# Patient Record
Sex: FEMALE
[De-identification: names, ages, dates, MRNs, and addresses within clinical notes are randomized per-mention and may not be internally consistent; named-entity substitution may affect disease eponyms.]

---

## 2019-04-22 NOTE — ULT
Ultrasound pelvic transabdominal with Doppler



HISTORY: Pelvic pain



COMPARISON: None



TECHNIQUE: Multiple grayscale and color Doppler images were obtained in a transabdominal pelvic ultra
sound. Spectral analysis of the Doppler waveforms of the ovaries were performed.





FINDINGS:

CERVIX: Unremarkable

UTERUS: Normal in size without focal abnormality.

ENDOMETRIAL STRIPE: 12 mm. Thickened.



No free fluid is present.



RIGHT OVARY: Normal flow, without focal mass. Benign-appearing cyst is present.

LEFT OVARY: Normal flow, without focal mass.





IMPRESSION: Mild thickening of the endometrium. This may be physiologic. Six-week pelvic ultrasound i
s recommended for further evaluation.



Reported By: Jono Diaz 

Electronically Signed:  4/22/2019 4:47 PM

## 2019-04-26 NOTE — CT
Exam: CT thoracic spine without contrast



HISTORY: Upper abdominal pain. Previous spinal injections.



COMPARISON: None



FINDINGS: Thoracic spine vertebral body height is maintained. No fracture. No spondylolisthesis or sp
ondylolysis.

Limited evaluation the contents of the central spinal canal and neural foramina. No significant steno
sis or neural foraminal narrowing

Visualized mediastinal structures are unremarkable. Visualized lung parenchyma is also unremarkable



IMPRESSION: Unremarkable thoracic spine CT. No significant stenosis on this examination, based upon n
oncontrast CT limitations



Reported By: Tana Elliott 

Electronically Signed:  4/26/2019 2:34 PM

## 2019-04-26 NOTE — CT
CT abdomen and pelvis with IV and oral contrast



HISTORY: Right abdomen pain.



FINDINGS: Lung bases are clear. Gallbladder is surgically absent. Solid organs are unremarkable. Urin
priya bladder has a normal appearance. Physiologic amount of free fluid within the pelvis. Appendix

is not inflamed.



In the left upper quadrant, the most proximal portion of the jejunum shows mild luminal expansion. Wa
ll is dilated to 0.7 cm thickness and is hyperdense on this portal venous phase imaging.. This

extends over a length of approximately 10 cm.



There is also very subtle edematous appearance of the gastric antrum with minimal adjacent fluid.







IMPRESSION: Wall thickening, hyperemia, and mild luminal distention of the most proximal portion of t
he jejunum. Very mild inflammation of the gastric antrum with small amount of adjacent fluid.



Inflammation (inflammatory bowel disease) versus neoplasm (small bowel lymphoma) are primary consider
ations.



Please consider endoscopic evaluation. Hopefully the most proximal portion of the jejunum can be visu
alized.



Reported By: DINESH Crowley 

Electronically Signed:  4/26/2019 2:33 PM

## 2019-04-28 NOTE — HP
CHIEF COMPLAINT:  Abdominal pain.



HISTORY OF PRESENT ILLNESS:  Ms. Nogueira is a 38-year-old woman, who developed

right-sided abdominal pain 2 weeks ago.  She complains of an aching to cramping pain

that originally started in the right lower quadrant that she thought was an ovarian

cyst, but then the pain moved up and settled more in the right upper quadrant

underneath the right ribs.  The pain is worse when she moves around, but not

affected by eating.  The pain is not affected by bowel movement.  She has a normal

soft brown bowel movement daily.  She has had no blood in the stool or no black

stools.  Her weight has been stable.  The pain though has been continuous, lasting

most of the day and then will exacerbate at times when she moves around or when she

sits for longer periods.  She has had no fever with this.  The pain is affected her

ability to exercise and do the things she needs to do.  She had blood work and a CT

scan to evaluate her pain.  The CT scan showed thickening of the proximal most

jejunum.  GI was asked to evaluate this. 



PAST MEDICAL HISTORY:  Negative.



PAST SURGICAL HISTORY:  Cholecystectomy, tonsillectomy, and D and C.  she had EGD

and colonoscopy by Dr. Spencer murcia in June of 2016 for evaluation of microcytic

anemia.  The upper and lower endoscopy were normal.  Duodenal biopsies were negative

for celiac disease.  Her anemia since corrected. 



FAMILY HISTORY:  Positive for lung cancer in her mother.  Negative for GI malignancy.



SOCIAL HISTORY:  No alcohol, tobacco, or drugs.



ALLERGIES:  PREVIOUSLY, SHE HAD MORPHINE LISTED AS AN ALLERGY.



MEDICATIONS:  At home, none.



REVIEW OF SYSTEMS:  Negative x10 systems reviewed except as stated in the history of

present illness. 



PHYSICAL EXAMINATION:

GENERAL:  She is in no acute distress.  Alert and oriented x3. 

HEENT:  Eyes have no scleral icterus.  Oropharynx is clear without lesions. 

NECK:  No cervical or supraclavicular lymphadenopathy. 

LUNGS:  Clear to auscultation bilaterally. 

HEART:  Regular rate and rhythm without murmur. 

ABDOMEN:  Mild tenderness in the epigastric region and right upper quadrant without

guarding.  Bowel sounds are present. 

ABDOMEN:  Nontender in the lower left and mildly tender in the right lower quadrant.

 The bowel sounds are present. 

EXTREMITIES:  No lower extremity edema.  Cranial nerves are grossly intact.



LABORATORY DATA:  She had blood drawn on 04/26/2019.  Her white blood cell count was

9.5, hemoglobin 13.4, platelets 234, and eosinophil count 0.1.  Creatinine 0.77,

bilirubin 0.2, AST 13, ALT 9, alkaline phosphatase 59, albumin 4.2.  Pregnancy test

was negative.  She had CT scan of the abdomen and pelvis on 04/26/2019 that showed

in the left upper quadrant in the most proximal portion of the jejunum showing mild

luminal expansion with wall thickening to 0.7 cm and hyperdensity; however, a 10 cm

length of jejunum.  There was also some subtle edematous appearance to the gastric

antrum.  Concern for a neoplastic or inflammatory process was raised by the

radiologist regarding this.  She did have a pelvic ultrasound on 04/22/2019, which

showed a benign-appearing cyst in the right ovary and a normal left ovary.  She had

a thoracic spine CT for further evaluation of the pain, which was normal. 



IMPRESSION:  Epigastric to right upper quadrant abdominal aching pain.  This is a

continuous pain, but does worsen with movement of her torso and also sitting.  The

pain is not affected by eating, not affected by bowel movement.  She has had no

bleeding or diarrhea, constipation, nausea, or vomiting.  Her white blood cell count

and hemoglobin are normal.  This may be more of a musculoskeletal type pain.  The

findings of the CT are concerning, but we should be able to reach this area with

push enteroscopy with upper endoscopy with the colonoscope.  This could be artifact

or inflammatory process or neoplastic process.  She does not take any medications on

a regular basis. 



RECOMMENDATIONS:  Push enteroscopy today.







Job ID:  [id number]

## 2019-04-28 NOTE — RAD
Two-view abdomen: Supine and upright views.



INDICATIONS: Postop.



Contrast is seen throughout the colon. Bowel gas pattern unremarkable. Scattered small bowel gas with
out dilatation. No free air apparent.



IMPRESSION: No acute finding



Reported By: José Miguel Banerjee 

Electronically Signed:  4/28/2019 2:44 PM

## 2019-04-28 NOTE — OP
DATE OF PROCEDURE:  04/28/2019



PROCEDURES PERFORMED:  Esophagogastroduodenoscopy with biopsy and push enteroscopy.



PREOPERATIVE DIAGNOSES:  Right upper quadrant abdominal pain and abnormal CT scan

showing thickening of the jejunum in the proximal most jejunum just beyond the

duodenum. 



DESCRIPTION OF PROCEDURE:  Informed consent was obtained from the patient.  She was

sedated with total intravenous anesthesia.  The colonoscope was advanced well into

the proximal jejunum.  The esophagus was normal.  The GE junction was normal.  There

was very mild erosive gastritis in the antrum of the stomach.  Biopsies were

obtained to rule out H. pylori.  Retroflexed views in the stomach were normal.  The

pylorus and first, second, third, and fourth portions of the duodenum were normal.

The proximal jejunum was normal. 



IMPRESSION:  

1. Very mild erosive antral gastritis.  Biopsies obtained to rule out Helicobacter

pylori. 

2. Otherwise, normal esophagogastroduodenoscopy and push enteroscopy to the proximal

jejunum. 



RECOMMENDATIONS:  

1. Prilosec over-the-counter once daily for 2 weeks.

2. Await histopathology.

3. Follow up in GI Clinic with Dr. Palmer.  Consider capsule endoscopy to evaluate

further into the jejunum if her symptoms continue.  Overall, I do think that we

reached the area that was abnormal by CT and this was endoscopically normal mucosa.

Most likely, she had an area of nondistention of the bowel wall at that point. 







Job ID:  [id number]

## 2019-04-28 NOTE — HP
HISTORY OF PRESENT ILLNESS:  Anjelica Nogueira is a 38-year-old female with 2

weeks of abdominal pain.  This is described as pressure type pain in her right upper

quadrant, right flank, radiating to her pelvis.  It was initially thought to be

neurogenic.  She had a pelvic ultrasound because of her concern of ovarian cyst and

this did not reveal any significant findings.  She is felt that she might have SI

syndrome as some of her pain was in her right flank and lower back and as I

examined, it was equivocal, thus she saw Dr. Ricki Carlisle, who felt her pain was more

lower thoracic in distribution.  She underwent a nerve block last week without any

relief.  She continued with pain 2 days later, unrelenting, having difficulty

sleeping.  This is associated with anorexia and nausea.  She then underwent a CAT

scan of her thoracic spine and CAT scan of her abdomen and pelvis.  CAT scan of her

thoracic spine was unremarkable.  CAT scan of her abdomen and pelvis, IV and p.o.

contrast revealed post cholecystectomy state, wall thickening, hyperemia, mild

luminal distention of the most proximal portion of the jejunum, very mild

inflammation of the gastric antrum with small amount of adjacent fluid.  Concern

about inflammatory bowel disease or less likely neoplasia was raised.  She had a CBC

and comprehensive metabolic profile that were unremarkable.  The patient continued

to have severe pain despite meloxicam and oral analgesics, thus was brought in today

for her unrelenting abdominal pain and right upper quadrant pain and Dr. Dontae Manley saw her and upper endoscopy performed without significant finding.  She had

mild gastritis, biopsy performed to rule out H. pylori.  He used a colonoscope to

try to visualize the proximal jejunum and there was question whether there was able

to visualize the problem in the area, where the radiological abnormality on her CAT

scan.  Postoperatively, she had a tremendous amount of pain requiring narcotics.

With time, this improved and she was back with the pain that she came in for except

more severe.  She received narcotics, which did not really resolve the pain.  Repeat

abdominal x-rays flat and upright were obtained revealing lack of constipation,

residual contrast in her colon from her CAT scan a few days prior, but no evidence

of free air on upright x-ray. 



The patient is now admitted for pain control PCA.  Diet as tolerated today and plan

diagnostic laparoscopy tomorrow to review the proximal small bowel.  There is

question whether this was addressed endoscopically.  This is the only objective

abnormality found today.  Consideration repeating her abdominal pelvis CAT scan was

given, but thought not to have much utility based on the recently normal CT scan

done with p.o. and IV contrast and thoracic spine images which as noted above were

normal. 



ALLERGIES:  NONE.  TOBACCO, NONE.  ALCOHOL, RARELY.



MEDICATIONS:  None routinely.



PAST SURGICAL HISTORY:  Laparoscopic cholecystectomy.



PAST MEDICAL HISTORY:  Noncontributory.



PHYSICAL EXAMINATION:

LUNGS:  Clear to auscultation. 

CARDIAC:  Regular rate and rhythm without murmur or gallop. 

ABDOMEN:  Tenderness in her right upper quadrant.  She does not have any chest wall

tenderness or pain.  Thoracolumbar spine without tenderness.  SI joint compression

does not evoke SI joint type syndrome. 

EXTREMITIES:  Unremarkable.



ASSESSMENT AND PLAN:  Abdominal pain of uncertain etiology with an abnormal CAT scan

noting proximal small-bowel segment, hyperemia and thickening.  This is unrelenting

after 2 weeks and not explained by her upper endoscopy.  Two years ago, Dr. Tavera

saw her for iron-deficiency anemia with upper and lower endoscopy and she was

treated with vitamins and iron and this has resolved.  Plan diagnostic laparoscopy

in the morning and procedure is indicated based on endoscopic findings.  She

understands risks, benefits, and consents. 







Job ID:  [id number]

## 2019-04-29 NOTE — OP
DATE OF PROCEDURE:  04/29/2019



PREOPERATIVE DIAGNOSES:  Abdominal pain, abnormal CAT scan with proximal small-
bowel

wall thickening and hyperemia. 



POSTOPERATIVE DIAGNOSES:  Abdominal pain, abnormal CAT scan with proximal

small-bowel wall thickening and hyperemia without abnormal finding. 



PROCEDURES PERFORMED:  Diagnostic laparoscopy running the entire small bowel 
from

the ligament of Treitz to the cecum, terminal ileum, noting normal appendix, 
normal

ovaries, normal uterus and visceral structures. 



ANESTHESIA:  General, local 0.5% Marcaine with epinephrine, 30 mL.



DESCRIPTION OF PROCEDURE:  The patient was taken to the operating room under 
general

anesthesia, supine position, abdomen was prepared with ChloraPrep and draped in

routine fashion.  Local anesthetic was infiltrated in the skin and subcutaneous

tissue about each port site.  Left lateral stab incision made, subcostal and

pneumoperitoneum to 15 mmHg obtained with a Veress needle, replaced with a 5 
port

and laparoscope inserted.  Remainder of the port sites were placed under

laparoscopic visualization.  Left lateral mid abdominal incision made and a 5 
port

placed.  Left lower quadrant lateral incision made and a 5 port placed.  The 
cecum

was identified.  Appendix was long, but normal.  Terminal ileum was identified 
and

followed proximally to the ligament of Treitz, carefully inspecting the small 
bowel

and noting small bowel to be entirely normal.  I could see unusually more of the

duodenum and proximal jejunum than usual due to her lack of intraabdominal fat.
  The

proximal jejunum and fourth portion of the duodenum were normal.  There were no

abnormalities.  There were no masses, no induration, no serosal abnormalities.  
I

then visualized her ovaries, which appeared normal.  She did have a small 
amount of

fluid in her abdominal cavity, which was aspirated.  The right colon appeared 
to be

normal.  Transverse colon proximally seemed to be normal.  Status post

cholecystectomy and subhepatic space could be seen without any adhesions.  There

were no perihepatic adhesions.  The patient tolerated the procedure well.

Pneumoperitoneum and fluid were evacuated.  Prior to evacuating pneumoperitoneum
,

there was a small cyst on the right ovary, which was 

cauterized and drained, but otherwise normal.  There was good hemostasis noted 
and

all pneumoperitoneum and fluid evacuated.  All instruments were removed and all 
skin

incisions were approximated with interrupted subdermal 4-0 Monocryl and Derma 
glue

applied. 







Job ID:  [id number]



[facility]

## 2019-05-01 NOTE — DIS
DATE OF ADMISSION:  04/28/2019



DATE OF DISCHARGE:  04/30/2019



DISCHARGE DIAGNOSES:  

1. Right abdominal pain, right flank, right upper quadrant, radiating to right

lateral abdomen and pelvis initially, now more localized to her right lateral lower

chest wall and right upper quadrant. 

2. Abnormal CAT scan suggesting hyperemic, thickened wall proximal jejunum just

beyond the ligament of Treitz. 



CONSULTATION:  Dr. Manley.



PROCEDURES:  Outpatient, she had a CAT scan of the abdomen and pelvis suggesting a

hyperemic area, proximal jejunum. 



EGD.  Dr. Manley using a colonoscope, visualizing the upper gastrointestinal tract

without abnormalities.  Biopsy, H pylori  pending.  Antral mild gastritis, placed on

PPI. 



Laparoscopy, diagnostic to look at the proximal jejunum, which was completely

normal.  There are no abnormalities and this radiological finding can be dismissed. 



Discharge home with the lidocaine patch topical, right lateral chest wall and we

will discuss with Dr. Ricki Carlisle of Pain Management that other patches that may

benefit her. 



HISTORY:  A 38-year-old female, 2 weeks of abdominal pain, treated as an outpatient,

undergoing a pelvic ultrasound initially that was negative.  Her pelvic pain

component resolved.  She had disabling pain, right abdomen interfering with her

lifestyle.  She had anorexia and nausea.  She underwent a CAT scan of the abdomen

and pelvis and a CT scan thoracic spine after nerve block lower thoracic by Dr. Carlisle, did not relieve her pain.  This CAT scan suggests an abnormality of the

proximal jejunum with thickened wall beyond the ligament of Treitz and hyperemic

malignancy could not be ruled out.  She had unrelenting pain, following was admitted

on Sunday morning, underwent upper endoscopy with a colonoscope with Dr. Dontae Manley and upper gastrointestinal tract was normal except for minimal gastritis for

which she is started on a PPI.  Biopsies, H pylori are pending.  Her pain persisted

and she was hospitalized after this due to severe intractable pain.  She then

underwent diagnostic laparoscopy that was completely normal.  The upper

gastrointestinal tract was normal.  The proximal bowel could be seen more readily

than normal due to the paucity of intraabdominal fat due to her healthy lifestyle.

There were no abnormalities.  Small bowel was run in its entirety.  After this, it

was clear that she did have some retained stool in the right colon.  She underwent a

GoLYTELY bowel prep.  This cleaned her out completely and had excellent response

from the GoLYTELY, but it did not relieve her pain.  The lidocaine 5% patch did help

her pain, however.  This dulled the pain and would become more tolerable once it

wore off and was removed.  Her pain returned.  She has been discharged home at this

time with pain management, Tylenol, ibuprofen, tramadol, and meloxicam as needed.  I

will discuss with Dr. Carlisle of patch management and the best patch for her and

called that into her pharmacy at a later date.  She will follow up in my office in 1

to 2 weeks.  Diet and activity as tolerated. 







Job ID:  [id number]

## 2019-08-20 NOTE — MMO
Bilateral MAMMO Bilat Screen [facility].

 

CLINICAL HISTORY:

Patient is 39 years old and is seen for screening. The patient has no family

history of breast cancer.  The patient has no personal history of cancer.

 

VIEWS:

The views performed were:  bilateral craniocaudal with tomosynthesis and

bilateral mediolateral oblique with tomosynthesis.

 

MAMMOGRAM FINDINGS:

The breasts are heterogeneously dense, which could obscure a lesion on

mammography.

 

There are no suspicious masses, suspicious calcifications, or new areas of

architectural distortion.

 

IMPRESSION:

THERE IS NO MAMMOGRAPHIC EVIDENCE OF MALIGNANCY.

 

A ROUTINE FOLLOW-UP MAMMOGRAM IN 1 YEAR IS RECOMMENDED.

 

THE RESULTS OF THIS EXAM WERE SENT TO THE PATIENT.

 

ACR BI-RADS Category 1 - Negative

 

MAMMOGRAPHY NOTE:

 1. A negative mammogram report should not delay a biopsy if a dominant of

 clinically suspicious mass is present.

 2. Approximately 10% to 15% of breast cancers are not detected by

 mammography.

 3. Adenosis and dense breasts may obscure an underlying neoplasm.

 

 

Reported by: RAFI RICHTER MD    Electonically Signed: [id number]

## 2022-09-13 ENCOUNTER — HOSPITAL ENCOUNTER (OUTPATIENT)
Dept: HOSPITAL 92 - BICMAMMO | Age: 42
Discharge: HOME | End: 2022-09-13
Attending: INTERNAL MEDICINE
Payer: COMMERCIAL

## 2022-09-13 DIAGNOSIS — N63.21: Primary | ICD-10-CM

## 2022-09-13 PROCEDURE — G0279 TOMOSYNTHESIS, MAMMO: HCPCS

## 2023-03-14 ENCOUNTER — HOSPITAL ENCOUNTER (OUTPATIENT)
Dept: HOSPITAL 92 - CSHMAMMO | Age: 43
Discharge: HOME | End: 2023-03-14
Attending: INTERNAL MEDICINE
Payer: COMMERCIAL

## 2023-03-14 DIAGNOSIS — R92.8: Primary | ICD-10-CM

## 2023-03-14 DIAGNOSIS — N63.20: ICD-10-CM

## 2023-03-14 DIAGNOSIS — N60.02: ICD-10-CM

## 2023-03-14 PROCEDURE — G0279 TOMOSYNTHESIS, MAMMO: HCPCS
